# Patient Record
Sex: FEMALE | Race: WHITE
[De-identification: names, ages, dates, MRNs, and addresses within clinical notes are randomized per-mention and may not be internally consistent; named-entity substitution may affect disease eponyms.]

---

## 2017-01-03 ENCOUNTER — HOSPITAL ENCOUNTER (OUTPATIENT)
Age: 52
Discharge: HOME | End: 2017-01-03
Payer: COMMERCIAL

## 2017-01-03 DIAGNOSIS — R92.8: Primary | ICD-10-CM

## 2017-08-24 ENCOUNTER — HOSPITAL ENCOUNTER (OUTPATIENT)
Dept: HOSPITAL 76 - LAB | Age: 52
Discharge: HOME | End: 2017-08-24
Attending: PHYSICIAN ASSISTANT
Payer: COMMERCIAL

## 2017-08-24 DIAGNOSIS — M51.36: Primary | ICD-10-CM

## 2017-08-24 DIAGNOSIS — M47.9: ICD-10-CM

## 2017-08-24 PROCEDURE — 72110 X-RAY EXAM L-2 SPINE 4/>VWS: CPT

## 2017-08-24 NOTE — XRAY REPORT
COMPLETE LUMBAR SPINE:  08/24/2017 

 

CLINICAL INDICATION:  Back pain, chronic. 

 

AP, lateral, oblique, coned-down views of the lumbar spine demonstrate minimal degenerative disk and 
facet disease.  There is no evidence of fracture or subluxation.  The bowel gas pattern appears unrem
arkable. 

 

IMPRESSION:  MINIMAL DEGENERATIVE CHANGES. 

 

 

 

DD:08/24/2017 15:10:03  DT: 08/24/2017 17:55  JOB #: [id number]  EXT JOB #:[id number]

## 2017-09-02 ENCOUNTER — HOSPITAL ENCOUNTER (OUTPATIENT)
Dept: HOSPITAL 76 - DI | Age: 52
Discharge: HOME | End: 2017-09-02
Attending: PHYSICIAN ASSISTANT
Payer: COMMERCIAL

## 2017-09-02 DIAGNOSIS — M51.26: ICD-10-CM

## 2017-09-02 DIAGNOSIS — G89.29: ICD-10-CM

## 2017-09-02 DIAGNOSIS — M54.5: Primary | ICD-10-CM

## 2017-09-02 PROCEDURE — 72148 MRI LUMBAR SPINE W/O DYE: CPT

## 2017-09-03 NOTE — MRI REPORT
EXAM:

MRI LUMBAR SPINE WITHOUT CONTRAST

 

EXAM DATE: 9/2/2017 01:20 PM.

 

CLINICAL HISTORY: Worsening low back pain. Report of low back pain 1-1/2 years after lifting injury.

 

COMPARISON: No prior MRI.

 

TECHNIQUE: Multiplanar, multisequence T1-weighted and fluid-sensitive sequences of the lumbar spine f
rom T12 to S1 without contrast. Other: None.

 

FINDINGS: 

Spinal Cord: The conus terminates at L1. No signal abnormality in the visualized spinal cord.

 

Alignment: Normal. No scoliosis or spondylolisthesis.

 

Bone Marrow: Five non-rib-bearing lumbar vertebral bodies are assumed. No acute vertebral body height
 loss or marrow edema. Incidental T11 vertebral body hemangioma.

 

Disk Levels/Facets:

T12-L1: Small chronic Schmorl's nodes. Otherwise unremarkable.

 

L1-L2: Minimal degenerative disk disease posteriorly. Slight annular bulge. Otherwise unremarkable, n
o stenosis.

 

L2-L3: Minimal degenerative disk space dehydration and narrowing. Shallow broad-based bulge. Minimal 
anterior marginal spurring. Unremarkable facets. No stenosis.

 

L3-L4: Minimal disk space dehydration and narrowing. Diffuse annular disk bulge. Additional more foca
l intraforaminal disk extrusion on the right creating moderate to marked right foraminal stenosis wit
h potential for right L3 nerve root impingement. This focal intraforaminal herniation measures about 
8 x 6 mm. Patent central canal and left foramen.

 

L4-L5: Minimally narrowed disk space. Bilateral intraforaminal disk protrusions, larger on the right 
than the left. Annular fissure is associated with the right intraforaminal protrusion. Patent central
 canal. Foraminal stenosis is mild on the left and moderate on the right.

 

L5-S1: Minimal disk space narrowing and dehydration. Negligible facet arthropathy. Small focal disk p
rotrusion with annular fissure to the right of midline, at the inferior junction of the right subarti
cular and foraminal zones. Associated stenosis is mild. There is no evidence for nerve root impingeme
nt or displacement.

 

Musculature: Normal. No edema or fatty atrophy. 

 

Other: Approximately 3 cm circumscribed fluid signal structure is present in the pelvis near the midl
ine superior to the expected location of the uterus; this is nonspecific, potentially a cyst or promi
nent ovarian follicle.

 

IMPRESSION: 

1. Moderately prominent foraminal stenosis on the right from intraforaminal disk herniation at the L3
-L4 level with potential for nerve root impingement.

 

2. Intraforaminal disk protrusions at L4-L5 associated with foraminal stenosis that is mild on the le
ft and moderate on the right.

 

3. Small right posterior paracentral disk protrusion with annular fissure at the L5-S1 level; associa
janell stenosis is minimal, no definite nerve root compression.

 

4. 3 cm pelvic cyst or cystlike structure, possibly of ovarian origin.

 

Comment: The following findings are so common in adults without low back pain that while we report th
eir presence, they must be interpreted with caution and in the context of the clinical situation. (Re
marcellus Cowan et al, Spine 2001)

 

Prevalence of findings in patients without low back pain:

Disk degeneration (any evidence): 92%

Disk desiccation/T2 signal loss: 83%

Disk height loss: 56%

Disk bulge: 64%

Disk protrusion: 32%

Annular tear/high intensity zone: 38%

 

[facility]

Referring Provider Line: [phone]

 

SITE ID: [id number]

## 2017-09-15 ENCOUNTER — HOSPITAL ENCOUNTER (OUTPATIENT)
Dept: HOSPITAL 76 - DI | Age: 52
Discharge: HOME | End: 2017-09-15
Attending: PHYSICIAN ASSISTANT
Payer: COMMERCIAL

## 2017-09-15 DIAGNOSIS — N83.292: ICD-10-CM

## 2017-09-15 DIAGNOSIS — N83.291: ICD-10-CM

## 2017-09-15 DIAGNOSIS — D25.1: Primary | ICD-10-CM

## 2017-09-15 PROCEDURE — 76830 TRANSVAGINAL US NON-OB: CPT

## 2017-09-15 PROCEDURE — 76856 US EXAM PELVIC COMPLETE: CPT

## 2017-09-18 NOTE — ULTRASOUND REPORT
EXAM:

PELVIC ULTRASOUND

 

EXAM DATE: 9/15/2017 06:58 PM.

 

CLINICAL HISTORY: Pelvic cyst on MRI 09/02/2017. LMP 09/13/2017.

 

COMPARISON: Lumbar spine MRI 09/02/2017.

 

TECHNIQUE: Realtime transabdominal pelvic scan performed to identify the uterus and adnexa and as an 
overview of other pelvic structures, followed by transvaginal scan to provide greater detail of the u
terus and adnexa, with static image documentation.

 

FINDINGS: 

Uterus: Anteverted position. 8.5 x 4.3 x 6.0 cm, volume 115 cc. Mildly heterogeneous myometrium with 
a solitary anterior intramural fibroid measuring 1.6 x 1.5 x 1.6 cm.

Endometrium: 3-4 mm. Normal.

Cervix: Nabothian cyst.

 

Right Ovary: 3.2 x 2.1 x 2.4 cm, volume 8.2 cc. Normal echotexture. Contains a simple cyst/dominant f
ollicle measuring 1.8 x 1.6 x 1.7 cm.

Left Ovary: 3.6 x 2.9 x 3.3 cm, volume 18.3 cc. Normal echotexture. Contains a simple cyst/dominant f
ollicle measuring 2.7 x 2.3 x 2.3 cm.

 

Free Fluid: None.

 

Other: None.

 

IMPRESSION: 

1. Solitary small intramural anterior uterine fibroid. 

2. Simple ovarian cysts/dominant follicles bilaterally, physiologic findings in a premenopausal patie
nt not requiring imaging follow-up.

 

[facility]

Referring Provider Line: [phone]

 

SITE ID: [id number]

## 2017-09-18 NOTE — ULTRASOUND REPORT
EXAM:

PELVIC ULTRASOUND

 

EXAM DATE: 9/15/2017 06:58 PM.

 

CLINICAL HISTORY: Pelvic cyst on MRI 09/02/2017. LMP 09/13/2017.

 

COMPARISON: Lumbar spine MRI 09/02/2017.

 

TECHNIQUE: Realtime transabdominal pelvic scan performed to identify the uterus and adnexa and as an 
overview of other pelvic structures, followed by transvaginal scan to provide greater detail of the u
terus and adnexa, with static image documentation.

 

FINDINGS: 

Uterus: Anteverted position. 8.5 x 4.3 x 6.0 cm, volume 115 cc. Mildly heterogeneous myometrium with 
a solitary anterior intramural fibroid measuring 1.6 x 1.5 x 1.6 cm.

Endometrium: 3-4 mm. Normal.

Cervix: Nabothian cyst.

 

Right Ovary: 3.2 x 2.1 x 2.4 cm, volume 8.2 cc. Normal echotexture. Contains a simple cyst/dominant f
ollicle measuring 1.8 x 1.6 x 1.7 cm.

Left Ovary: 3.6 x 2.9 x 3.3 cm, volume 18.3 cc. Normal echotexture. Contains a simple cyst/dominant f
ollicle measuring 2.7 x 2.3 x 2.3 cm.

 

Free Fluid: None.

 

Other: None.

 

IMPRESSION: 

1. Solitary small intramural anterior uterine fibroid. 

2. Simple ovarian cysts/dominant follicles bilaterally, physiologic findings in a premenopausal patie
nt not requiring imaging follow-up.

 

[facility]

Referring Provider Line: [phone]

 

SITE ID: [id number]

## 2018-01-25 ENCOUNTER — HOSPITAL ENCOUNTER (OUTPATIENT)
Dept: HOSPITAL 76 - SDS | Age: 53
Discharge: HOME | End: 2018-01-25
Attending: SURGERY
Payer: COMMERCIAL

## 2018-01-25 ENCOUNTER — HOSPITAL ENCOUNTER (OUTPATIENT)
Dept: HOSPITAL 76 - LAB | Age: 53
Discharge: HOME | End: 2018-01-25
Attending: PHYSICIAN ASSISTANT
Payer: COMMERCIAL

## 2018-01-25 VITALS — DIASTOLIC BLOOD PRESSURE: 60 MMHG | SYSTOLIC BLOOD PRESSURE: 133 MMHG

## 2018-01-25 DIAGNOSIS — Z00.00: Primary | ICD-10-CM

## 2018-01-25 DIAGNOSIS — E78.2: ICD-10-CM

## 2018-01-25 DIAGNOSIS — Z72.89: ICD-10-CM

## 2018-01-25 DIAGNOSIS — D50.9: ICD-10-CM

## 2018-01-25 DIAGNOSIS — Z79.899: ICD-10-CM

## 2018-01-25 DIAGNOSIS — E78.5: ICD-10-CM

## 2018-01-25 DIAGNOSIS — E55.9: ICD-10-CM

## 2018-01-25 DIAGNOSIS — K64.8: ICD-10-CM

## 2018-01-25 DIAGNOSIS — Z12.11: Primary | ICD-10-CM

## 2018-01-25 DIAGNOSIS — Z11.59: ICD-10-CM

## 2018-01-25 DIAGNOSIS — E11.59: ICD-10-CM

## 2018-01-25 DIAGNOSIS — Z00.00: ICD-10-CM

## 2018-01-25 LAB
ALBUMIN DIAFP-MCNC: 4.3 G/DL (ref 3.2–5.5)
ALBUMIN/GLOB SERPL: 1.3 {RATIO} (ref 1–2.2)
ALP SERPL-CCNC: 38 IU/L (ref 42–121)
ALT SERPL W P-5'-P-CCNC: 18 IU/L (ref 10–60)
ANION GAP SERPL CALCULATED.4IONS-SCNC: 15 MMOL/L (ref 6–13)
AST SERPL W P-5'-P-CCNC: 25 IU/L (ref 10–42)
BASOPHILS NFR BLD AUTO: 0 10^3/UL (ref 0–0.1)
BASOPHILS NFR BLD AUTO: 0.4 %
BILIRUB BLD-MCNC: 1.1 MG/DL (ref 0.2–1)
BUN SERPL-MCNC: 12 MG/DL (ref 6–20)
CALCIUM UR-MCNC: 9.1 MG/DL (ref 8.5–10.3)
CHLORIDE SERPL-SCNC: 101 MMOL/L (ref 101–111)
CHOLEST SERPL-MCNC: 181 MG/DL
CO2 SERPL-SCNC: 21 MMOL/L (ref 21–32)
CREAT SERPLBLD-SCNC: 0.9 MG/DL (ref 0.4–1)
EOSINOPHIL # BLD AUTO: 0.1 10^3/UL (ref 0–0.7)
EOSINOPHIL NFR BLD AUTO: 0.7 %
ERYTHROCYTE [DISTWIDTH] IN BLOOD BY AUTOMATED COUNT: 14.2 % (ref 12–15)
GFRSERPLBLD MDRD-ARVRAT: 66 ML/MIN/{1.73_M2} (ref 89–?)
GLOBULIN SER-MCNC: 3.2 G/DL (ref 2.1–4.2)
GLUCOSE SERPL-MCNC: 62 MG/DL (ref 70–100)
HDLC SERPL-MCNC: 64 MG/DL
HDLC SERPL: 2.8 {RATIO} (ref ?–4.4)
HGB UR QL STRIP: 13.5 G/DL (ref 12–16)
LDLC SERPL CALC-MCNC: 107 MG/DL
LDLC/HDLC SERPL: 1.7 {RATIO} (ref ?–4.4)
LYMPHOCYTES # SPEC AUTO: 1 10^3/UL (ref 1.5–3.5)
LYMPHOCYTES NFR BLD AUTO: 9.6 %
MCH RBC QN AUTO: 29.1 PG (ref 27–31)
MCHC RBC AUTO-ENTMCNC: 33 G/DL (ref 32–36)
MCV RBC AUTO: 88.1 FL (ref 81–99)
MONOCYTES # BLD AUTO: 0.5 10^3/UL (ref 0–1)
MONOCYTES NFR BLD AUTO: 4.9 %
NEUTROPHILS # BLD AUTO: 8.7 10^3/UL (ref 1.5–6.6)
NEUTROPHILS # SNV AUTO: 10.3 X10^3/UL (ref 4.8–10.8)
NEUTROPHILS NFR BLD AUTO: 84.4 %
PDW BLD AUTO: 7.9 FL (ref 7.9–10.8)
PLATELET # BLD: 247 10^3/UL (ref 130–450)
PROT SPEC-MCNC: 7.5 G/DL (ref 6.7–8.2)
RBC MAR: 4.64 10^6/UL (ref 4.2–5.4)
SODIUM SERPLBLD-SCNC: 137 MMOL/L (ref 135–145)
VLDLC SERPL-SCNC: 10 MG/DL

## 2018-01-25 PROCEDURE — 83721 ASSAY OF BLOOD LIPOPROTEIN: CPT

## 2018-01-25 PROCEDURE — 86803 HEPATITIS C AB TEST: CPT

## 2018-01-25 PROCEDURE — 84443 ASSAY THYROID STIM HORMONE: CPT

## 2018-01-25 PROCEDURE — 36415 COLL VENOUS BLD VENIPUNCTURE: CPT

## 2018-01-25 PROCEDURE — 85025 COMPLETE CBC W/AUTO DIFF WBC: CPT

## 2018-01-25 PROCEDURE — 80061 LIPID PANEL: CPT

## 2018-01-25 PROCEDURE — 45378 DIAGNOSTIC COLONOSCOPY: CPT

## 2018-01-25 PROCEDURE — 82306 VITAMIN D 25 HYDROXY: CPT

## 2018-01-25 PROCEDURE — 80053 COMPREHEN METABOLIC PANEL: CPT

## 2018-01-25 PROCEDURE — 0DJD8ZZ INSPECTION OF LOWER INTESTINAL TRACT, VIA NATURAL OR ARTIFICIAL OPENING ENDOSCOPIC: ICD-10-PCS | Performed by: SURGERY

## 2018-01-25 NOTE — HISTORY & PHYSICAL EXAMINATION
HPI





- History of Present Illness


HPI Comment/Other: 








Patient here for first screening colonoscopy.





Current Meds: 


GENTLE IRON 28-60-0.008-0.4 MG ORAL CAPSULE (FE BISGLY-VIT C-VIT B12-FA) 


[facility] MELATONIN 3 MG ORAL TABLET (MELATONIN) 1 by mouth as needed


ASPIRIN EC 81 MG ORAL TABLET DELAYED RELEASE (ASPIRIN) Take one tablet by mouth 

once daily with food


EQL VITAMIN D3 2000 UNIT ORAL CAPSULE (CHOLECALCIFEROL) 1 by mouth daily


ZOCOR 10 MG ORAL TABLET (SIMVASTATIN) Take one tablet by mouth at bedtime














Past Medical History:


   Reviewed history and no changes required:


      Hyperlipidemia





Past Surgical History:


   Reviewed history and no changes required:


      BTL


      C section





Family History Summary: 


   Reviewed history and no changes required: 11/15/2017


Mother (biol.) - Has Family History of Breast Cancer - Entered On: 11/15/2017


Father (biol.) - Has Family History of Heart Disease - Pancreatic cancer - 

Entered On: 11/15/2017


Father (biol.) - Has Family History of Other Cancer - Pancreatic cancer - 

Entered On: 11/15/2017








Social History:


   Reviewed history and no changes required:








Risk Factors: 





Smoked Tobacco Use:  Never smoker


Alcohol use:  yes


   Type:  rare


Exercise:  yes


   Times per week:  3


   Type of Exercise:  cardio








Review of Systems 


     See HPI





Problems were reviewed with the patient during this visit.


Medications were reviewed with the patient during this visit.


Allergies were reviewed with the patient during this visit.


Allergies: 


SULFA (Mild)











Physical Exam





General:


    well developed, well nourished, in no acute distress


Lungs:


    clear bilaterally to A & P


Heart:


    regular rate and rhythm, S1, S2 without murmurs, rubs, gallops, or clicks


Abdomen:


    bowel sounds positive; abdomen soft and non-tender without masses, 

organomegaly, or hernias noted


Pulses:


    pulses normal in all 4 extremities


Extremities:


    no clubbing, cyanosis, edema, or deformity noted with normal full range of 

motion of all joints











Impression & Recommendations:





Problem # 1:  screening colonoscopy


Will proceed with colonoscopy.





PMH/PSH





- Past Medical History


Cardiovascular: positive: High cholesterol


Respiratory: positive: None


Endocrine/Autoimmune: positive: None


GI: positive: GERD


: positive: None


HEENT: positive: None


Psych: positive: None


Musculoskeletal: positive: None


Derm: positive: None


MRSA Hx?: No





Meds/Allgy





- Home Medications


Home Medications: 


 Ambulatory Orders











 Medication  Instructions  Recorded  Confirmed


 


Aspirin 81 mg PO 01/24/18 


 


Cholecalciferol (Vitamin D3) 1,000 unit PO 01/24/18 





[Vitamin D3]   


 


Iron,Carbonyl [Iron Chews] 15 mg PO 01/24/18 


 


Melatonin 1 mg PO 01/24/18 


 


Simvastatin [Zocor] 20 mg PO 01/24/18 


 


Ibuprofen [Advil] 400 mg PO PRN 01/25/18 














- Allergies


Allergies/Adverse Reactions: 


 Allergies











Allergy/AdvReac Type Severity Reaction Status Date / Time


 


Sulfa (Sulfonamide Allergy  Rash Verified 01/24/18 13:23





Antibiotics)     














Exam





- Vital Signs


Vital Signs: 





 Vital Signs x48h











  Temp Pulse Resp BP Pulse Ox


 


 01/25/18 10:05  36.5 C  81  16  116/74  100

## 2018-01-26 LAB
HEPATITIS C ANTIBODY: (no result)
SIGNAL TO CUT-OFF: 0 (ref ?–1)

## 2018-04-04 ENCOUNTER — HOSPITAL ENCOUNTER (OUTPATIENT)
Dept: HOSPITAL 76 - DI | Age: 53
Discharge: HOME | End: 2018-04-04
Attending: PHYSICIAN ASSISTANT
Payer: COMMERCIAL

## 2018-04-04 DIAGNOSIS — Z12.31: Primary | ICD-10-CM

## 2018-04-04 DIAGNOSIS — Z80.3: ICD-10-CM

## 2018-04-04 PROCEDURE — 77067 SCR MAMMO BI INCL CAD: CPT

## 2018-04-05 NOTE — MAMMOGRAPHY REPORT
DIGITAL SCREENING MAMMOGRAM:  04/04/2018

 

HISTORY:  Family history of breast cancer.

 

TECHNIQUE:  Bilateral digital CC and MLO projections.

 

COMPARISON:  12/22/2016.

 

FINDINGS:  Scattered fibroglandular densities.  No dominant mass, architectural distortion, 

skin thickening, suspicious clustered microcalcifications, or interval change.

 

IMPRESSION:  NEGATIVE - BI-RADS CATEGORY 1.  SUGGEST ROUTINE FOLLOWUP IN 12 MONTHS.

 

STANDARD QUALIFYING STATEMENTS:

1.  This examination was reviewed with the aid of Computer-Aided Detection (CAD).

2.  A negative or benign imaging report should not delay biopsy if clinically suspicious 

findings are present.  Consider surgical consultation if warranted.  More than 5% of cancers 

are not identified by imaging.

3.  Dense breasts may obscure an underlying neoplasm.

 

 

DD: 04/05/2018 09:54

TD: 04/05/2018 10:11

Job #: [id number]

## 2018-06-13 ENCOUNTER — HOSPITAL ENCOUNTER (OUTPATIENT)
Dept: HOSPITAL 76 - DI | Age: 53
Discharge: HOME | End: 2018-06-13
Attending: PHYSICIAN ASSISTANT
Payer: COMMERCIAL

## 2018-06-13 DIAGNOSIS — M79.672: Primary | ICD-10-CM

## 2018-06-14 NOTE — XRAY REPORT
Procedure Date:  06/13/2018   

Accession Number:  [id number] / [id number]                    

Procedure:  XR  - Calcaneus LT CPT Code:  

 

FULL RESULT:

 

 

EXAM: Calcaneus LT

 

DATE: 6/13/2018 8:22 PM

 

CLINICAL HISTORY: LEFT HEEL PAIN

 

TECHNIQUE: 2 view left calcaneus

 

COMPARISON: Left foot films 8/24/2006

 

FINDINGS:

There is no evidence of acute fracture. The joint spaces are preserved. 

No foreign body is seen in the soft tissues.

 

IMPRESSION:

Normal left calcaneus.

## 2019-04-26 ENCOUNTER — HOSPITAL ENCOUNTER (OUTPATIENT)
Dept: HOSPITAL 76 - DI | Age: 54
Discharge: HOME | End: 2019-04-26
Attending: INTERNAL MEDICINE
Payer: COMMERCIAL

## 2019-04-26 ENCOUNTER — HOSPITAL ENCOUNTER (OUTPATIENT)
Dept: HOSPITAL 76 - LAB | Age: 54
Discharge: HOME | End: 2019-04-26
Attending: INTERNAL MEDICINE
Payer: COMMERCIAL

## 2019-04-26 DIAGNOSIS — E78.5: Primary | ICD-10-CM

## 2019-04-26 DIAGNOSIS — Z12.31: Primary | ICD-10-CM

## 2019-04-26 DIAGNOSIS — Z80.3: ICD-10-CM

## 2019-04-26 DIAGNOSIS — D50.9: ICD-10-CM

## 2019-04-26 LAB
ALBUMIN DIAFP-MCNC: 3.9 G/DL (ref 3.2–5.5)
ALBUMIN/GLOB SERPL: 1.1 {RATIO} (ref 1–2.2)
ALP SERPL-CCNC: 57 IU/L (ref 42–121)
ALT SERPL W P-5'-P-CCNC: 24 IU/L (ref 10–60)
ANION GAP SERPL CALCULATED.4IONS-SCNC: 10 MMOL/L (ref 6–13)
AST SERPL W P-5'-P-CCNC: 23 IU/L (ref 10–42)
BILIRUB BLD-MCNC: 0.8 MG/DL (ref 0.2–1)
BUN SERPL-MCNC: 13 MG/DL (ref 6–20)
CALCIUM UR-MCNC: 9.2 MG/DL (ref 8.5–10.3)
CHLORIDE SERPL-SCNC: 105 MMOL/L (ref 101–111)
CHOLEST SERPL-MCNC: 204 MG/DL
CO2 SERPL-SCNC: 25 MMOL/L (ref 21–32)
CREAT SERPLBLD-SCNC: 0.8 MG/DL (ref 0.4–1)
ERYTHROCYTE [DISTWIDTH] IN BLOOD BY AUTOMATED COUNT: 14.4 % (ref 12–15)
GFRSERPLBLD MDRD-ARVRAT: 75 ML/MIN/{1.73_M2} (ref 89–?)
GLOBULIN SER-MCNC: 3.5 G/DL (ref 2.1–4.2)
GLUCOSE SERPL-MCNC: 102 MG/DL (ref 70–100)
HDLC SERPL-MCNC: 76 MG/DL
HDLC SERPL: 2.7 {RATIO} (ref ?–4.4)
HGB UR QL STRIP: 13.2 G/DL (ref 12–16)
LDLC SERPL CALC-MCNC: 117 MG/DL
LDLC/HDLC SERPL: 1.5 {RATIO} (ref ?–4.4)
MCH RBC QN AUTO: 28.6 PG (ref 27–31)
MCHC RBC AUTO-ENTMCNC: 33 G/DL (ref 32–36)
MCV RBC AUTO: 86.6 FL (ref 81–99)
NEUTROPHILS # SNV AUTO: 5.6 X10^3/UL (ref 4.8–10.8)
PDW BLD AUTO: 7.2 FL (ref 7.9–10.8)
PLATELET # BLD: 266 10^3/UL (ref 130–450)
PROT SPEC-MCNC: 7.4 G/DL (ref 6.7–8.2)
RBC MAR: 4.61 10^6/UL (ref 4.2–5.4)
SODIUM SERPLBLD-SCNC: 140 MMOL/L (ref 135–145)
VLDLC SERPL-SCNC: 11 MG/DL

## 2019-04-26 PROCEDURE — 36415 COLL VENOUS BLD VENIPUNCTURE: CPT

## 2019-04-26 PROCEDURE — 80053 COMPREHEN METABOLIC PANEL: CPT

## 2019-04-26 PROCEDURE — 83721 ASSAY OF BLOOD LIPOPROTEIN: CPT

## 2019-04-26 PROCEDURE — 85027 COMPLETE CBC AUTOMATED: CPT

## 2019-04-26 PROCEDURE — 80061 LIPID PANEL: CPT

## 2019-04-26 PROCEDURE — 77063 BREAST TOMOSYNTHESIS BI: CPT

## 2019-04-26 PROCEDURE — 77067 SCR MAMMO BI INCL CAD: CPT

## 2019-04-26 NOTE — MAMMOGRAPHY REPORT
Reason:  SCREENING MAMMO

Procedure Date:  04/26/2019   

Accession Number:  [id number] / [id number]                    

Procedure:  F[facility] - Screening Mammo w/Trung CPT Code:  

 

FULL RESULT:

 

 

EXAM: Screening Mammo w/Trung

 

DATE: 4/26/2019 8:40 AM

 

CLINICAL HISTORY:  Screening examination. Family history of breast cancer 

in the mother at age 79.

 

TECHNIQUE: (B) - Bilateral  CC and MLO views were obtained.

 

COMPARISON: 4/4/2018 through 5/20/2015.

 

PARENCHYMAL PATTERN: (A) - The breast(s) demonstrate(s) scattered 

fibroglandular densities.

 

FINDINGS:

There are no suspicious masses, calcifications, or areas of distortion.

 

IMPRESSION: Negative examination. BI-RADS category 1.

 

RECOMMENDATION: (ANNUAL)  - Recommend routine annual screening 

mammography.

 

BI-RADS CATEGORY: (1) - Negative.

 

STANDARD QUALIFYING STATEMENTS:

1.  This examination was not reviewed with the aid of Computer-Aided 

Detection (CAD).

2.  A negative or benign  imaging report should not preclude biopsy if 

clinically suspicious findings are present.

3.  Dense breasts may obscure an underlying neoplasm.

4. This examination was reviewed with the aid of 3D breast imaging 

(tomosynthesis).

## 2020-08-31 ENCOUNTER — HOSPITAL ENCOUNTER (EMERGENCY)
Dept: HOSPITAL 76 - ED | Age: 55
Discharge: HOME | End: 2020-08-31
Payer: COMMERCIAL

## 2020-08-31 VITALS — DIASTOLIC BLOOD PRESSURE: 74 MMHG | SYSTOLIC BLOOD PRESSURE: 128 MMHG

## 2020-08-31 DIAGNOSIS — E78.00: ICD-10-CM

## 2020-08-31 DIAGNOSIS — Z82.49: ICD-10-CM

## 2020-08-31 DIAGNOSIS — R07.89: Primary | ICD-10-CM

## 2020-08-31 DIAGNOSIS — Z79.82: ICD-10-CM

## 2020-08-31 LAB
ALBUMIN DIAFP-MCNC: 4.4 G/DL (ref 3.2–5.5)
ALBUMIN/GLOB SERPL: 1.4 {RATIO} (ref 1–2.2)
ALP SERPL-CCNC: 57 IU/L (ref 42–121)
ALT SERPL W P-5'-P-CCNC: 23 IU/L (ref 10–60)
ANION GAP SERPL CALCULATED.4IONS-SCNC: 10 MMOL/L (ref 6–13)
AST SERPL W P-5'-P-CCNC: 22 IU/L (ref 10–42)
BASOPHILS NFR BLD AUTO: 0 10^3/UL (ref 0–0.1)
BASOPHILS NFR BLD AUTO: 0.4 %
BILIRUB BLD-MCNC: 0.5 MG/DL (ref 0.2–1)
BUN SERPL-MCNC: 17 MG/DL (ref 6–20)
CALCIUM UR-MCNC: 9.6 MG/DL (ref 8.5–10.3)
CHLORIDE SERPL-SCNC: 102 MMOL/L (ref 101–111)
CO2 SERPL-SCNC: 25 MMOL/L (ref 21–32)
CREAT SERPLBLD-SCNC: 0.9 MG/DL (ref 0.4–1)
EOSINOPHIL # BLD AUTO: 0.1 10^3/UL (ref 0–0.7)
EOSINOPHIL NFR BLD AUTO: 1 %
ERYTHROCYTE [DISTWIDTH] IN BLOOD BY AUTOMATED COUNT: 13.7 % (ref 12–15)
GLOBULIN SER-MCNC: 3.2 G/DL (ref 2.1–4.2)
GLUCOSE SERPL-MCNC: 95 MG/DL (ref 70–100)
HGB UR QL STRIP: 14.1 G/DL (ref 12–16)
LIPASE SERPL-CCNC: 35 U/L (ref 22–51)
LYMPHOCYTES # SPEC AUTO: 1.4 10^3/UL (ref 1.5–3.5)
LYMPHOCYTES NFR BLD AUTO: 17.6 %
MCH RBC QN AUTO: 28.5 PG (ref 27–31)
MCHC RBC AUTO-ENTMCNC: 31.8 G/DL (ref 32–36)
MCV RBC AUTO: 89.5 FL (ref 81–99)
MONOCYTES # BLD AUTO: 0.7 10^3/UL (ref 0–1)
MONOCYTES NFR BLD AUTO: 8.3 %
NEUTROPHILS # BLD AUTO: 5.8 10^3/UL (ref 1.5–6.6)
NEUTROPHILS # SNV AUTO: 8 X10^3/UL (ref 4.8–10.8)
NEUTROPHILS NFR BLD AUTO: 72.3 %
PDW BLD AUTO: 9.3 FL (ref 7.9–10.8)
PLATELET # BLD: 286 10^3/UL (ref 130–450)
PROT SPEC-MCNC: 7.6 G/DL (ref 6.7–8.2)
RBC MAR: 4.95 10^6/UL (ref 4.2–5.4)
SODIUM SERPLBLD-SCNC: 137 MMOL/L (ref 135–145)

## 2020-08-31 PROCEDURE — 93005 ELECTROCARDIOGRAM TRACING: CPT

## 2020-08-31 PROCEDURE — 84484 ASSAY OF TROPONIN QUANT: CPT

## 2020-08-31 PROCEDURE — 71045 X-RAY EXAM CHEST 1 VIEW: CPT

## 2020-08-31 PROCEDURE — 99284 EMERGENCY DEPT VISIT MOD MDM: CPT

## 2020-08-31 PROCEDURE — 36415 COLL VENOUS BLD VENIPUNCTURE: CPT

## 2020-08-31 PROCEDURE — 85025 COMPLETE CBC W/AUTO DIFF WBC: CPT

## 2020-08-31 PROCEDURE — 80053 COMPREHEN METABOLIC PANEL: CPT

## 2020-08-31 PROCEDURE — 83690 ASSAY OF LIPASE: CPT

## 2020-08-31 NOTE — ED PHYSICIAN DOCUMENTATION
PD HPI CHEST PAIN





- Stated complaint


Stated Complaint: CHEST PX





- Chief complaint


Chief Complaint: Cardiac





- History obtained from


History obtained from: Patient





- Additional information


Additional information: 





Since yesterday he has had constant very mild substernal nonradiating chest pain

not associated with shortness of breath.  It is more worrying than anything 

else.  She denies pedal edema or calf pain.  She had a similar episode several 

months ago that she was not seen for And she attributed it to anxiety.  No 

history of heart problems but her father had a heart attack at a relatively ada

g age and she does have hypercholesterolemia.


The pain does not change with exertion.





Review of Systems


Ten Systems: 10 systems reviewed and negative


Constitutional: reports: Reviewed and negative


Throat: reports: Reviewed and negative


Respiratory: reports: Reviewed and negative





PD PAST MEDICAL HISTORY





- Past Medical History


Cardiovascular: High cholesterol


Respiratory: None


Endocrine/Autoimmune: None


GI: GERD


: None


HEENT: None


Psych: None


Musculoskeletal: None


Derm: None





- Present Medications


Home Medications: 


                                Ambulatory Orders











 Medication  Instructions  Recorded  Confirmed


 


Aspirin 81 mg PO 01/24/18 


 


Cholecalciferol (Vitamin D3) 1,000 unit PO 01/24/18 





[Vitamin D3]   


 


Iron,Carbonyl [Iron Chews] 15 mg PO 01/24/18 


 


Melatonin 1 mg PO 01/24/18 


 


Simvastatin [Zocor] 20 mg PO 01/24/18 


 


Ibuprofen [Advil] 400 mg PO PRN 01/25/18 














- Allergies


Allergies/Adverse Reactions: 


                                    Allergies











Allergy/AdvReac Type Severity Reaction Status Date / Time


 


Sulfa (Sulfonamide Allergy  Rash Verified 08/31/20 15:59





Antibiotics)     














PD ED PE NORMAL





- Vitals


Vital signs reviewed: Yes





- General


General: Alert and oriented X 3, No acute distress





- HEENT


HEENT: PERRL, EOMI





- Neck


Neck: Supple, no meningeal sign, No bony TTP





- Cardiac


Cardiac: RRR, No murmur





- Respiratory


Respiratory: No respiratory distress, Clear bilaterally





- Abdomen


Abdomen: Non tender





- Back


Back: No CVA TTP, No spinal TTP





- Derm


Derm: Normal color, Warm and dry





- Extremities


Extremities: No edema, No calf tenderness / cord





- Neuro


Neuro: Alert and oriented X 3, Normal speech





Results





- Vitals


Vitals: 


                               Vital Signs - 24 hr











  08/31/20 08/31/20





  15:53 18:00


 


Temperature 37.3 C 36.6 C


 


Heart Rate 96 77


 


Respiratory 18 14





Rate  


 


Blood Pressure 143/83 H 128/74


 


O2 Saturation 98 98








                                     Oxygen











O2 Source                      Room air

















- EKG (time done)


  ** 1600


Rate: Rate (enter#) (85)


Rhythm: NSR


Axis: Normal


Intervals: Normal NH


QRS: Normal


Ischemia: Normal ST segments


Computer interpretation: Agree with computer





- Labs


Labs: 


                                Laboratory Tests











  08/31/20 08/31/20 08/31/20





  16:40 16:40 16:40


 


WBC  8.0  


 


RBC  4.95  


 


Hgb  14.1  


 


Hct  44.3  


 


MCV  89.5  


 


MCH  28.5  


 


MCHC  31.8 L  


 


RDW  13.7  


 


Plt Count  286  


 


MPV  9.3  


 


Neut # (Auto)  5.8  


 


Lymph # (Auto)  1.4 L  


 


Mono # (Auto)  0.7  


 


Eos # (Auto)  0.1  


 


Baso # (Auto)  0.0  


 


Absolute Nucleated RBC  0.00  


 


Nucleated RBC %  0.0  


 


Sodium   137 


 


Potassium   3.8 


 


Chloride   102 


 


Carbon Dioxide   25 


 


Anion Gap   10.0 


 


BUN   17 


 


Creatinine   0.9 


 


Estimated GFR (MDRD)   65 L 


 


Glucose   95 


 


Calcium   9.6 


 


Total Bilirubin   0.5 


 


AST   22 


 


ALT   23 


 


Alkaline Phosphatase   57 


 


Troponin I High Sens    < 2.3 L


 


Total Protein   7.6 


 


Albumin   4.4 


 


Globulin   3.2 


 


Albumin/Globulin Ratio   1.4 


 


Lipase   35 














- Rads (name of study)


  ** 1v chest


Radiology: EMP read contemporaneously (NAD)





PD MEDICAL DECISION MAKING





- ED course


ED course: 





Heart score 2





Departure





- Departure


Disposition: 01 Home, Self Care


Clinical Impression: 


 Atypical chest pain





Condition: Good


Record reviewed to determine appropriate education?: Yes


Instructions:  ED Chest Pain Atypical Unkn Cause


Comments: 


No evidence of acute coronary syndrome on work-up today, follow-up with your 

doctor, next available ointment for repeat evaluation and further treatment.


Return if worse.


Discharge Date/Time: 08/31/20 18:02

## 2020-08-31 NOTE — XRAY REPORT
PROCEDURE:  Chest 1 View X-Ray

 

INDICATIONS:  Chest Pain

 

TECHNIQUE:  One view of the chest was acquired.  

 

COMPARISON:  None

 

FINDINGS:  

 

Surgical changes and devices:  None.  

 

Lungs and pleura:  No pleural effusions or pneumothorax.  Lungs are clear.  

 

Mediastinum:  Mediastinal contours appear normal.  Heart size is normal.  

 

Bones and chest wall:  No suspicious bony lesions.  Overlying soft tissues appear unremarkable.  

 

IMPRESSION:  

No acute process.

 

Reviewed by: Cheyenne León MD on 8/31/2020 5:50 PM PDT

Approved by: Cheyenne León MD on 8/31/2020 5:50 PM PDT

 

 

Station ID:  [id number]

## 2020-09-21 ENCOUNTER — HOSPITAL ENCOUNTER (OUTPATIENT)
Dept: HOSPITAL 76 - DI | Age: 55
Discharge: HOME | End: 2020-09-21
Attending: FAMILY MEDICINE
Payer: COMMERCIAL

## 2020-09-21 DIAGNOSIS — R07.89: Primary | ICD-10-CM

## 2020-09-21 PROCEDURE — 78452 HT MUSCLE IMAGE SPECT MULT: CPT

## 2020-09-21 PROCEDURE — 93017 CV STRESS TEST TRACING ONLY: CPT

## 2020-09-21 NOTE — NUCLEAR MEDICINE REPORT
PROCEDURE:  

Rest and exercise myocardial perfusion SPECT with gated imaging and ejection fraction

 

INDICATIONS:  ATYPICAL CHEST PAIN

 

RADIOPHARMACEUTICAL:  8.2 mCi Tc-99m Myoview IV at rest and 26.1 mCi Tc-99m Myoview IV at peak exerci
se.  One-day-protocol was performed.  

 

TECHNIQUE:  Radiopharmaceutical was injected at peak stress test, and also at rest.  SPECT images wer
e obtained.  SPECT myocardial perfusion images were displayed in short axis, horizontal long axis, an
d vertical long axis views.  Gated images were reviewed using Auto[employer] software.  

 

COMPARISON:   None available.

 

FINDINGS:  

 

Raw data:  There is good myocardial labeling by radiotracer.  No significant motion artifacts.  Lung-
to-heart ratio is 0.35 (normal is less than 0.38 for tetrafosmin tracer).  

 

Left ventricle function:  Gated images demonstrate normal left ventricle wall thickening.  No segment
al wall motion abnormality.  No transient ischemic dilation; TID is 0.97 (normal less than 1.3).  The
 left ventricle resting end-diastolic volume is normal.  Left ventricle stress ejection fraction is 0
.35; normal values are above 45%.  

 

Myocardial perfusion:  There is normal distribution of activity in the left and right ventricular vaishnavi
cardium.  No fixed or reversible perfusion defects.

 

IMPRESSION:  

 

1. Normal myocardial perfusion images. No perfusion defect to suggest myocardial ischemia or infarct.


2. Normal left ventricular volume and systolic function.

 

PQRS ATTESTATIONS:  

Measure 322 - Is this imaging test primarily performed on a low-risk surgery patient for preoperative
 evaluation within 30 days preceding their low-risk non-cardiac surgery?  Low-risk surgery is defined
 as cardiac death or myocardial infarction less than 1%, including (but not limited to) endoscopic pr
ocedures, superficial procedures, cataract surgery, and excisional breast surgery:  Answer:  No

Measure 323 - Is this imaging test performed primarily for the monitoring of an asymptomatic patient 
who had percutaneous coronary intervention on the visit date or within 2 years of the visit date?  An
swer:  No

Measure 324 - Is this imaging test performed primarily for the initial detection and risk assessment 
on an asymptomatic, low coronary heart disease patient?  Low CHD risk definition = clinicians should 
consider the maximum number of available patient factors used to estimate risk based on [place] (A
TP III criteria), typically age, gender, diabetes, smoking status, and use of blood pressure medicati
on, and integrate age appropriate estimates for missing elements, such as LDL or standard blood press
ure.  Answer:  No

 

Reviewed by: Juan Pablo oRdrigez MD on 9/21/2020 3:34 PM PDT

Approved by: Juan Pablo Rodrigez MD on 9/21/2020 3:34 PM PDT

 

 

Station ID:  [id number]

## 2020-09-21 NOTE — CARDIAC PROCEDURE NOTE
DATE OF SERVICE: 09/21/2020

Physician: Lata Hines MD

 

INDICATION:  Atypical chest pain.

 

CARDIAC RISK FACTORS  

1.  Hyperlipidemia.

2.  Postmenopausal status.

3.  Family history of early heart disease (her father had cardiac disease in his
30s).

 

PROCEDURE:  After signing informed consent, the patient underwent a Ty-
protocol treadmill stress test with nuclear myocardial perfusion imaging.

 

RESTING HEART RATE:  52.  PEAK HEART RATE:  154 (93% predicted maximum heart 
rate for age).

 

RESTING BLOOD PRESSURE:  121/70.  PEAK BLOOD PRESSURE:  171/63.

 

The patient exercised for 8 minutes and 21 seconds on a Ty-protocol treadmill
stress test.  She achieved a peak heart rate of 154 (93% PMHR) and 10.2 METs.  
Patient had mild shortness of breath.  The patient had no chest pain with 
exercise.  She rated her perceived exertion at 17/20 on the Ramiro scale at peak. 
Oxygen saturation was 94%-99% on room air throughout the test.  After 4 minutes 
of recovery, the patient described her typical anterior chest discomfort, which 
she rated 01/10.  An EKG was done at that time and was no different than the 
peak EKG.  The chest pain resolved spontaneously in 1 minute.

 

RESTING EKG:  Sinus bradycardia, within normal limits.

 

EKG AT PEAK:  Inferolateral scooping ST-depressions of 2 mm are present at peak.

 

SUMMARY  

1.  Normal resting EKG.

2.  Good exercise tolerance.

3.  Nonspecific changes of ST scooping abnormalities occur at peak exercise, not
specific for ischemia.

4.  The patient had her typical chest pain, which was not associated with 
ischemic changes.

5.  This patient's cardiac risk based on all the above:  Low (but her lifetime 
risk is Moderate because of her risk factors).

6.  NUCLEAR IMAGES WERE REPORTED SEPARATELY AND SHOWED no fixed or ischemic 
perfusion defects, no transient ischemic dilatation.



CONCLUSION:

NORMAL STRESS TEST

 

cc: HEMANT Larsen, NP-C

      Avery Walters MD

DD: 09/21/2020 13:01

TD: 09/21/2020 13:04

Job #: [id number]

[facility]JOSE DE JESUS Agree with resident note. Patient personally seen and examined. All current imaging studies reviewed.  Patient has remained neurologically intact. Angio today negative. For MRI/A

## 2021-04-09 ENCOUNTER — HOSPITAL ENCOUNTER (OUTPATIENT)
Dept: HOSPITAL 76 - LAB | Age: 56
Discharge: HOME | End: 2021-04-09
Attending: NURSE PRACTITIONER
Payer: COMMERCIAL

## 2021-04-09 DIAGNOSIS — E78.5: ICD-10-CM

## 2021-04-09 DIAGNOSIS — Z00.00: Primary | ICD-10-CM

## 2021-04-09 DIAGNOSIS — R07.89: ICD-10-CM

## 2021-04-09 DIAGNOSIS — Z79.899: ICD-10-CM

## 2021-04-09 DIAGNOSIS — Z78.0: ICD-10-CM

## 2021-04-09 DIAGNOSIS — G47.00: ICD-10-CM

## 2021-04-09 DIAGNOSIS — F43.0: ICD-10-CM

## 2021-04-09 LAB
ALBUMIN DIAFP-MCNC: 4 G/DL (ref 3.2–5.5)
ALBUMIN/GLOB SERPL: 1.3 {RATIO} (ref 1–2.2)
ALP SERPL-CCNC: 60 IU/L (ref 42–121)
ALT SERPL W P-5'-P-CCNC: 38 IU/L (ref 10–60)
ANION GAP SERPL CALCULATED.4IONS-SCNC: 6 MMOL/L (ref 6–13)
AST SERPL W P-5'-P-CCNC: 28 IU/L (ref 10–42)
BASOPHILS NFR BLD AUTO: 0 10^3/UL (ref 0–0.1)
BASOPHILS NFR BLD AUTO: 0.8 %
BILIRUB BLD-MCNC: 0.6 MG/DL (ref 0.2–1)
BUN SERPL-MCNC: 17 MG/DL (ref 6–20)
CALCIUM UR-MCNC: 9.5 MG/DL (ref 8.5–10.3)
CHLORIDE SERPL-SCNC: 105 MMOL/L (ref 101–111)
CHOLEST SERPL-MCNC: 217 MG/DL
CO2 SERPL-SCNC: 28 MMOL/L (ref 21–32)
CREAT SERPLBLD-SCNC: 0.8 MG/DL (ref 0.4–1)
EOSINOPHIL # BLD AUTO: 0.2 10^3/UL (ref 0–0.7)
EOSINOPHIL NFR BLD AUTO: 4.6 %
ERYTHROCYTE [DISTWIDTH] IN BLOOD BY AUTOMATED COUNT: 13.5 % (ref 12–15)
GFRSERPLBLD MDRD-ARVRAT: 74 ML/MIN/{1.73_M2} (ref 89–?)
GLOBULIN SER-MCNC: 3.2 G/DL (ref 2.1–4.2)
GLUCOSE SERPL-MCNC: 109 MG/DL (ref 70–100)
HCT VFR BLD AUTO: 42.7 % (ref 37–47)
HDLC SERPL-MCNC: 80 MG/DL
HDLC SERPL: 2.7 {RATIO} (ref ?–4.4)
HGB UR QL STRIP: 14 G/DL (ref 12–16)
LDLC SERPL CALC-MCNC: 121 MG/DL
LDLC/HDLC SERPL: 1.5 {RATIO} (ref ?–4.4)
LYMPHOCYTES # SPEC AUTO: 1.6 10^3/UL (ref 1.5–3.5)
LYMPHOCYTES NFR BLD AUTO: 31.3 %
MCH RBC QN AUTO: 28.6 PG (ref 27–31)
MCHC RBC AUTO-ENTMCNC: 32.8 G/DL (ref 32–36)
MCV RBC AUTO: 87.3 FL (ref 81–99)
MONOCYTES # BLD AUTO: 0.6 10^3/UL (ref 0–1)
MONOCYTES NFR BLD AUTO: 11.9 %
NEUTROPHILS # BLD AUTO: 2.7 10^3/UL (ref 1.5–6.6)
NEUTROPHILS # SNV AUTO: 5.2 X10^3/UL (ref 4.8–10.8)
NEUTROPHILS NFR BLD AUTO: 51 %
NRBC # BLD AUTO: 0 /100WBC
NRBC # BLD AUTO: 0 X10^3/UL
PDW BLD AUTO: 9.3 FL (ref 7.9–10.8)
PLATELET # BLD: 257 10^3/UL (ref 130–450)
POTASSIUM SERPL-SCNC: 4.2 MMOL/L (ref 3.5–5)
PROT SPEC-MCNC: 7.2 G/DL (ref 6.7–8.2)
RBC MAR: 4.89 10^6/UL (ref 4.2–5.4)
SODIUM SERPLBLD-SCNC: 139 MMOL/L (ref 135–145)
TRIGL P FAST SERPL-MCNC: 79 MG/DL
TSH SERPL-ACNC: 3.4 UIU/ML (ref 0.34–5.6)
VLDLC SERPL-SCNC: 16 MG/DL

## 2021-04-09 PROCEDURE — 80061 LIPID PANEL: CPT

## 2021-04-09 PROCEDURE — 80053 COMPREHEN METABOLIC PANEL: CPT

## 2021-04-09 PROCEDURE — 83721 ASSAY OF BLOOD LIPOPROTEIN: CPT

## 2021-04-09 PROCEDURE — 85025 COMPLETE CBC W/AUTO DIFF WBC: CPT

## 2021-04-09 PROCEDURE — 36415 COLL VENOUS BLD VENIPUNCTURE: CPT

## 2021-04-09 PROCEDURE — 84443 ASSAY THYROID STIM HORMONE: CPT

## 2021-07-20 ENCOUNTER — HOSPITAL ENCOUNTER (OUTPATIENT)
Dept: HOSPITAL 76 - DI | Age: 56
Discharge: HOME | End: 2021-07-20
Attending: FAMILY MEDICINE
Payer: COMMERCIAL

## 2021-07-20 DIAGNOSIS — Z12.31: Primary | ICD-10-CM

## 2021-07-21 NOTE — MAMMOGRAPHY REPORT
BILATERAL DIGITAL SCREENING MAMMOGRAM 3D/2D: 7/20/2021

 

CLINICAL: Routine screening.  

 

Comparison is made to exams dated:  6/25/2020 ultrasound, 6/25/2020 mammogram - [facility], 6/18/2020 mammogram, 4/26/2019 mammogram, 4/26/2019 mammogram, and 4/4/2018 mammogram - [facility].  There are scattered fibroglandular elements in both breasts.  

 

No significant masses, calcifications, or other findings are seen in either breast.  

There has been no significant interval change.

 

IMPRESSION: NEGATIVE

There is no mammographic evidence of malignancy. A 1 year screening mammogram is recommended.  

 

 

This exam was interpreted at Station ID: [id number].  

 

NOTE: For mammograms, a report in lay terms will be sent to the patient. Approximately 15% of breast 
malignancies will not be visualized mammographically. In the management of a palpable breast mass, a 
negative mammogram must not discourage biopsy of a clinically suspicious lesion.

 

Electronically Signed By: Zaire Raymond M.D.          

ddp/penrad:7/20/2021 14:44:13  

 

 

 

ACR BI-RADS Category 1: Negative 3341F

PARENCHYMAL PATTERN: (A) - The breast(s) demonstrate(s) scattered fibroglandular densities.

BI-RADS CATEGORY: (1) - 1

RECOMMENDATION: (ANNUAL)  - Recommend routine annual screening mammography.

[id number]

1 year screening

LATERALITY: (B)

## 2021-09-21 ENCOUNTER — HOSPITAL ENCOUNTER (OUTPATIENT)
Dept: HOSPITAL 76 - DI | Age: 56
Discharge: HOME | End: 2021-09-21
Attending: FAMILY MEDICINE
Payer: COMMERCIAL

## 2021-09-21 DIAGNOSIS — M25.462: Primary | ICD-10-CM

## 2021-09-22 NOTE — ULTRASOUND REPORT
PROCEDURE:  Ext Limited Non Vascular

 

INDICATIONS:  LEFT KNEE BAKERS CYST

 

TECHNIQUE:  Real-time scanning was performed of the left knee, with image documentation.  

 

COMPARISON:  None.

 

FINDINGS:  Mild fluid is present within the knee joint. However, no definitive Baker cyst or other fl
uid collection is identified. Popliteal vein appears patent.

 

IMPRESSION:  Mild knee joint fluid without visualized Baker's cyst.

 

Reviewed by: Silvia Young MD on 9/22/2021 8:48 AM PDT

Approved by: Silvia Young MD on 9/22/2021 8:48 AM PDT

 

 

Station ID:  [id number]

## 2021-12-07 ENCOUNTER — HOSPITAL ENCOUNTER (OUTPATIENT)
Dept: HOSPITAL 76 - DI.N | Age: 56
Discharge: HOME | End: 2021-12-07
Attending: PHYSICIAN ASSISTANT
Payer: COMMERCIAL

## 2021-12-07 DIAGNOSIS — M25.571: Primary | ICD-10-CM

## 2021-12-07 NOTE — XRAY REPORT
PROCEDURE:  Ankle 3 View RT

 

INDICATIONS:  RIGHT ANKLE PAIN

 

TECHNIQUE:  3 views of the ankle were acquired.  

 

COMPARISON:  None

 

FINDINGS:  

 

Bones:  No fractures or dislocations.  Ankle mortise is normally aligned.  No suspicious bony lesions
.  

 

Soft tissues:  No tibiotalar joint effusion.  Achilles tendon appears normal.  

 

IMPRESSION:  No ankle fracture or dislocation. Ankle mortise is congruent. No gross soft tissue abnor
mality.

 

Reviewed by: Nate Busch MD on 12/7/2021 6:07 PM PST

Approved by: Nate Busch MD on 12/7/2021 6:07 PM PST

 

 

Station ID:  [id number]

## 2022-05-02 ENCOUNTER — HOSPITAL ENCOUNTER (OUTPATIENT)
Dept: HOSPITAL 76 - DI | Age: 57
Discharge: HOME | End: 2022-05-02
Attending: PHYSICIAN ASSISTANT
Payer: COMMERCIAL

## 2022-05-02 DIAGNOSIS — M17.11: Primary | ICD-10-CM

## 2022-05-02 NOTE — XRAY REPORT
PROCEDURE:  Knee 3 View RT

 

INDICATIONS:  M25.561, PX IN RT KNEE

 

TECHNIQUE:  3 views of the right knee knee(s) were acquired.  

 

COMPARISON:  None.

 

FINDINGS:  

 

Bones:  No fractures or dislocations.  Medial joint space narrowing. No suspicious bony lesions.  

 

Soft tissues:  No joint effusion.  No suspicious soft tissue calcifications.  

 

IMPRESSION:  

1. No acute abnormality.

2. Medial joint space narrowing can be seen with meniscal disorder.

 

Reviewed by: Ford Jacobson on 5/2/2022 9:16 AM PDT

Approved by: Ford Jacobson on 5/2/2022 9:16 AM PDT

 

 

Station ID:  [id number]

## 2022-07-10 ENCOUNTER — HOSPITAL ENCOUNTER (OUTPATIENT)
Dept: HOSPITAL 76 - LAB | Age: 57
Discharge: HOME | End: 2022-07-10
Attending: NURSE PRACTITIONER
Payer: COMMERCIAL

## 2022-07-10 DIAGNOSIS — E78.5: Primary | ICD-10-CM

## 2022-07-10 DIAGNOSIS — R53.83: ICD-10-CM

## 2022-07-10 LAB
ALBUMIN DIAFP-MCNC: 3.9 G/DL (ref 3.2–5.5)
ALBUMIN/GLOB SERPL: 1.3 {RATIO} (ref 1–2.2)
ALP SERPL-CCNC: 58 IU/L (ref 42–121)
ALT SERPL W P-5'-P-CCNC: 21 IU/L (ref 10–60)
ANION GAP SERPL CALCULATED.4IONS-SCNC: 8 MMOL/L (ref 6–13)
AST SERPL W P-5'-P-CCNC: 20 IU/L (ref 10–42)
BASOPHILS NFR BLD AUTO: 0 10^3/UL (ref 0–0.1)
BASOPHILS NFR BLD AUTO: 0.7 %
BILIRUB BLD-MCNC: 0.6 MG/DL (ref 0.2–1)
BUN SERPL-MCNC: 13 MG/DL (ref 6–20)
CALCIUM UR-MCNC: 9.1 MG/DL (ref 8.5–10.3)
CHLORIDE SERPL-SCNC: 104 MMOL/L (ref 101–111)
CHOLEST SERPL-MCNC: 194 MG/DL
CLARITY UR REFRACT.AUTO: CLEAR
CO2 SERPL-SCNC: 26 MMOL/L (ref 21–32)
CREAT SERPLBLD-SCNC: 0.8 MG/DL (ref 0.4–1)
EOSINOPHIL # BLD AUTO: 0.3 10^3/UL (ref 0–0.7)
EOSINOPHIL NFR BLD AUTO: 4.6 %
ERYTHROCYTE [DISTWIDTH] IN BLOOD BY AUTOMATED COUNT: 13.9 % (ref 12–15)
GFRSERPLBLD MDRD-ARVRAT: 74 ML/MIN/{1.73_M2} (ref 89–?)
GLOBULIN SER-MCNC: 3.1 G/DL (ref 2.1–4.2)
GLUCOSE SERPL-MCNC: 105 MG/DL (ref 70–100)
GLUCOSE UR QL STRIP.AUTO: NEGATIVE MG/DL
HCT VFR BLD AUTO: 39.8 % (ref 37–47)
HDLC SERPL-MCNC: 67 MG/DL
HDLC SERPL: 2.9 {RATIO} (ref ?–4.4)
HGB UR QL STRIP: 13 G/DL (ref 12–16)
KETONES UR QL STRIP.AUTO: NEGATIVE MG/DL
LDLC SERPL CALC-MCNC: 104 MG/DL
LDLC/HDLC SERPL: 1.6 {RATIO} (ref ?–4.4)
LYMPHOCYTES # SPEC AUTO: 1.6 10^3/UL (ref 1.5–3.5)
LYMPHOCYTES NFR BLD AUTO: 29.9 %
MCH RBC QN AUTO: 28.2 PG (ref 27–31)
MCHC RBC AUTO-ENTMCNC: 32.7 G/DL (ref 32–36)
MCV RBC AUTO: 86.3 FL (ref 81–99)
MONOCYTES # BLD AUTO: 0.6 10^3/UL (ref 0–1)
MONOCYTES NFR BLD AUTO: 10.8 %
NEUTROPHILS # BLD AUTO: 2.9 10^3/UL (ref 1.5–6.6)
NEUTROPHILS # SNV AUTO: 5.4 X10^3/UL (ref 4.8–10.8)
NEUTROPHILS NFR BLD AUTO: 53.8 %
NITRITE UR QL STRIP.AUTO: NEGATIVE
NRBC # BLD AUTO: 0 /100WBC
NRBC # BLD AUTO: 0 X10^3/UL
PDW BLD AUTO: 9.2 FL (ref 7.9–10.8)
PH UR STRIP.AUTO: 6 PH (ref 5–7.5)
PLATELET # BLD: 282 10^3/UL (ref 130–450)
POTASSIUM SERPL-SCNC: 4.3 MMOL/L (ref 3.5–5)
PROT SPEC-MCNC: 7 G/DL (ref 6.7–8.2)
PROT UR STRIP.AUTO-MCNC: NEGATIVE MG/DL
RBC # UR STRIP.AUTO: NEGATIVE /UL
RBC # URNS HPF: (no result) /HPF (ref 0–5)
RBC MAR: 4.61 10^6/UL (ref 4.2–5.4)
SODIUM SERPLBLD-SCNC: 138 MMOL/L (ref 135–145)
SP GR UR STRIP.AUTO: 1.02 (ref 1–1.03)
SQUAMOUS URNS QL MICRO: (no result)
TRIGL P FAST SERPL-MCNC: 115 MG/DL
TSH SERPL-ACNC: 2.63 UIU/ML (ref 0.34–5.6)
UROBILINOGEN UR QL STRIP.AUTO: (no result) E.U./DL
UROBILINOGEN UR STRIP.AUTO-MCNC: NEGATIVE MG/DL
VIT B12 SERPL-MCNC: 279 PG/ML (ref 180–914)
VLDLC SERPL-SCNC: 23 MG/DL
WBC # UR MANUAL: (no result) /HPF (ref 0–5)

## 2022-07-10 PROCEDURE — 87086 URINE CULTURE/COLONY COUNT: CPT

## 2022-07-10 PROCEDURE — 80053 COMPREHEN METABOLIC PANEL: CPT

## 2022-07-10 PROCEDURE — 36415 COLL VENOUS BLD VENIPUNCTURE: CPT

## 2022-07-10 PROCEDURE — 85025 COMPLETE CBC W/AUTO DIFF WBC: CPT

## 2022-07-10 PROCEDURE — 83721 ASSAY OF BLOOD LIPOPROTEIN: CPT

## 2022-07-10 PROCEDURE — 80061 LIPID PANEL: CPT

## 2022-07-10 PROCEDURE — 82607 VITAMIN B-12: CPT

## 2022-07-10 PROCEDURE — 81001 URINALYSIS AUTO W/SCOPE: CPT

## 2022-07-10 PROCEDURE — 84443 ASSAY THYROID STIM HORMONE: CPT

## 2023-07-18 ENCOUNTER — HOSPITAL ENCOUNTER (OUTPATIENT)
Dept: HOSPITAL 76 - LAB | Age: 58
Discharge: HOME | End: 2023-07-18
Attending: NURSE PRACTITIONER
Payer: COMMERCIAL

## 2023-07-18 DIAGNOSIS — K21.9: ICD-10-CM

## 2023-07-18 DIAGNOSIS — R03.0: Primary | ICD-10-CM

## 2023-07-18 DIAGNOSIS — E78.5: ICD-10-CM

## 2023-07-18 LAB
ALBUMIN DIAFP-MCNC: 4.4 G/DL (ref 3.2–5.5)
ALBUMIN/GLOB SERPL: 1.2 {RATIO} (ref 1–2.2)
ALP SERPL-CCNC: 68 IU/L (ref 42–121)
ALT SERPL W P-5'-P-CCNC: 29 IU/L (ref 10–60)
ANION GAP SERPL CALCULATED.4IONS-SCNC: 8 MMOL/L (ref 6–13)
AST SERPL W P-5'-P-CCNC: 21 IU/L (ref 10–42)
BASOPHILS NFR BLD AUTO: 0 10^3/UL (ref 0–0.1)
BASOPHILS NFR BLD AUTO: 0.6 %
BILIRUB BLD-MCNC: 0.5 MG/DL (ref 0.2–1)
BUN SERPL-MCNC: 11 MG/DL (ref 6–20)
CALCIUM UR-MCNC: 9.1 MG/DL (ref 8.5–10.3)
CHLORIDE SERPL-SCNC: 103 MMOL/L (ref 101–111)
CHOLEST SERPL-MCNC: 198 MG/DL
CO2 SERPL-SCNC: 28 MMOL/L (ref 21–32)
CREAT SERPLBLD-SCNC: 0.9 MG/DL (ref 0.4–1)
EOSINOPHIL # BLD AUTO: 0.3 10^3/UL (ref 0–0.7)
EOSINOPHIL NFR BLD AUTO: 4.8 %
ERYTHROCYTE [DISTWIDTH] IN BLOOD BY AUTOMATED COUNT: 14.2 % (ref 12–15)
GFRSERPLBLD MDRD-ARVRAT: 64 ML/MIN/{1.73_M2} (ref 89–?)
GLOBULIN SER-MCNC: 3.7 G/DL (ref 2.1–4.2)
GLUCOSE SERPL-MCNC: 132 MG/DL (ref 70–100)
HCT VFR BLD AUTO: 43 % (ref 37–47)
HDLC SERPL-MCNC: 67 MG/DL
HDLC SERPL: 3 {RATIO} (ref ?–4.4)
HGB UR QL STRIP: 13.9 G/DL (ref 12–16)
LDLC SERPL CALC-MCNC: 107 MG/DL
LDLC/HDLC SERPL: 1.6 {RATIO} (ref ?–4.4)
LYMPHOCYTES # SPEC AUTO: 1.6 10^3/UL (ref 1.5–3.5)
LYMPHOCYTES NFR BLD AUTO: 23.8 %
MCH RBC QN AUTO: 27.6 PG (ref 27–31)
MCHC RBC AUTO-ENTMCNC: 32.3 G/DL (ref 32–36)
MCV RBC AUTO: 85.3 FL (ref 81–99)
MONOCYTES # BLD AUTO: 0.6 10^3/UL (ref 0–1)
MONOCYTES NFR BLD AUTO: 8.9 %
NEUTROPHILS # BLD AUTO: 4 10^3/UL (ref 1.5–6.6)
NEUTROPHILS # SNV AUTO: 6.5 X10^3/UL (ref 4.8–10.8)
NEUTROPHILS NFR BLD AUTO: 61.4 %
NRBC # BLD AUTO: 0 /100WBC
NRBC # BLD AUTO: 0 X10^3/UL
PDW BLD AUTO: 9 FL (ref 7.9–10.8)
PLATELET # BLD: 332 10^3/UL (ref 130–450)
POTASSIUM SERPL-SCNC: 4.1 MMOL/L (ref 3.5–5)
PROT SPEC-MCNC: 8.2 G/DL (ref 6.7–8.2)
RBC MAR: 5.04 10^6/UL (ref 4.2–5.4)
SODIUM SERPLBLD-SCNC: 139 MMOL/L (ref 135–145)
TRIGL P FAST SERPL-MCNC: 118 MG/DL
VLDLC SERPL-SCNC: 24 MG/DL

## 2023-07-18 PROCEDURE — 80061 LIPID PANEL: CPT

## 2023-07-18 PROCEDURE — 80053 COMPREHEN METABOLIC PANEL: CPT

## 2023-07-18 PROCEDURE — 85025 COMPLETE CBC W/AUTO DIFF WBC: CPT

## 2023-07-18 PROCEDURE — 83721 ASSAY OF BLOOD LIPOPROTEIN: CPT

## 2023-07-18 PROCEDURE — 36415 COLL VENOUS BLD VENIPUNCTURE: CPT

## 2023-08-15 ENCOUNTER — HOSPITAL ENCOUNTER (OUTPATIENT)
Dept: HOSPITAL 76 - DI | Age: 58
Discharge: HOME | End: 2023-08-15
Attending: NURSE PRACTITIONER
Payer: COMMERCIAL

## 2023-08-15 DIAGNOSIS — Z12.31: Primary | ICD-10-CM

## 2023-08-16 NOTE — MAMMOGRAPHY REPORT
BILATERAL DIGITAL SCREENING MAMMOGRAM 3D/2D: 8/15/2023

 

CLINICAL: Routine screening.  

 

Comparison is made to exams dated:  7/20/2021 mammogram - [facility], 6/25/2020 brittny
mogram - [facility], 6/18/2020 mammogram, 4/26/2019 mammogram, and 4/26/2019 mammogram - 
[facility].  

 

There are scattered areas of fibroglandular density in both breasts (category b / 25%-50% glandular t
issue).  

 

No significant masses, calcifications, or other findings are seen in either breast.  

There has been no significant interval change.

 

IMPRESSION: NEGATIVE

There is no mammographic evidence of malignancy. A 1 year screening mammogram is recommended.  

 

Based on the Tyrer Cuzick model (a risk assessment model) the patients lifetime risk is 17.0% and he
r 10 year risk is 6.4%. According to the ACR, ACS, and NCCN guidelines, an annual breast MRI exam sanya
ng with mammogram is recommended if the patients lifetime risk is 20% or greater.

 

 

This exam was interpreted at Station ID: [id number].  

 

NOTE: For mammograms, a report in lay terms will be sent to the patient. Approximately 15% of breast 
malignancies will not be visualized mammographically. In the management of a palpable breast mass, a 
negative mammogram must not discourage biopsy of a clinically suspicious lesion.

 

Electronically Signed By: Carley canales/shlomo:8/15/2023 17:30:42  

 

 

letter sent: No_Letter  

ACR BI-RADS Category 1: Negative 3341F

PARENCHYMAL PATTERN: (A) - The breast(s) demonstrate(s) scattered fibroglandular densities.

BI-RADS CATEGORY: (1) - 1

Mammogram

[id number]

1 year screening

LATERALITY: (B)

## 2023-11-28 ENCOUNTER — HOSPITAL ENCOUNTER (OUTPATIENT)
Dept: HOSPITAL 76 - LAB | Age: 58
Discharge: HOME | End: 2023-11-28
Attending: FAMILY MEDICINE
Payer: COMMERCIAL

## 2023-11-28 DIAGNOSIS — Z20.822: Primary | ICD-10-CM

## 2023-11-28 LAB
FLUAV RNA RESP QL NAA+PROBE: NOT DETECTED
HAEM INFLU B DNA SPEC QL NAA+PROBE: NOT DETECTED
RSV RNA RESP QL NAA+PROBE: NOT DETECTED
SARS-COV-2 RNA PNL SPEC NAA+PROBE: NOT DETECTED

## 2023-11-28 PROCEDURE — 87637 SARSCOV2&INF A&B&RSV AMP PRB: CPT
